# Patient Record
Sex: MALE | Race: WHITE | Employment: FULL TIME | URBAN - NONMETROPOLITAN AREA
[De-identification: names, ages, dates, MRNs, and addresses within clinical notes are randomized per-mention and may not be internally consistent; named-entity substitution may affect disease eponyms.]

---

## 2022-10-17 ENCOUNTER — OFFICE VISIT (OUTPATIENT)
Dept: FAMILY MEDICINE CLINIC | Age: 22
End: 2022-10-17
Payer: COMMERCIAL

## 2022-10-17 VITALS
SYSTOLIC BLOOD PRESSURE: 124 MMHG | DIASTOLIC BLOOD PRESSURE: 80 MMHG | TEMPERATURE: 98.3 F | OXYGEN SATURATION: 96 % | WEIGHT: 193 LBS | HEART RATE: 85 BPM | RESPIRATION RATE: 18 BRPM

## 2022-10-17 DIAGNOSIS — J02.9 SORE THROAT: ICD-10-CM

## 2022-10-17 DIAGNOSIS — J02.0 ACUTE STREPTOCOCCAL PHARYNGITIS: Primary | ICD-10-CM

## 2022-10-17 LAB — S PYO AG THROAT QL: POSITIVE

## 2022-10-17 PROCEDURE — G8427 DOCREV CUR MEDS BY ELIG CLIN: HCPCS | Performed by: NURSE PRACTITIONER

## 2022-10-17 PROCEDURE — G8421 BMI NOT CALCULATED: HCPCS | Performed by: NURSE PRACTITIONER

## 2022-10-17 PROCEDURE — 99213 OFFICE O/P EST LOW 20 MIN: CPT | Performed by: NURSE PRACTITIONER

## 2022-10-17 PROCEDURE — 87880 STREP A ASSAY W/OPTIC: CPT | Performed by: NURSE PRACTITIONER

## 2022-10-17 PROCEDURE — G8484 FLU IMMUNIZE NO ADMIN: HCPCS | Performed by: NURSE PRACTITIONER

## 2022-10-17 PROCEDURE — 4004F PT TOBACCO SCREEN RCVD TLK: CPT | Performed by: NURSE PRACTITIONER

## 2022-10-17 RX ORDER — AMOXICILLIN 875 MG/1
875 TABLET, COATED ORAL 2 TIMES DAILY
Qty: 20 TABLET | Refills: 0 | Status: SHIPPED | OUTPATIENT
Start: 2022-10-17 | End: 2022-10-27

## 2022-10-17 NOTE — PROGRESS NOTES
Subjective:  Chief Complaint   Patient presents with    Pharyngitis       HPI:  The patient states that they have had a sore throat for the last 1 days. Cough is productive of sputum. The patient also reports mild sore throat without pain with swallowing/difficulty swallowing. Reports fever at home. Patient denies CP or dyspnea. No vomiting or diarrhea. Patient has been taking OTC medications without improvement. The patient presents for evaluation. ROS:  Positive and pertinent negatives as per HPI. All other systems are reviewed and negative. Current Outpatient Medications:     amoxicillin (AMOXIL) 875 MG tablet, Take 1 tablet by mouth 2 times daily for 10 days, Disp: 20 tablet, Rfl: 0   No Known Allergies     Objective:  Vitals:    10/17/22 1041   BP: 124/80   Pulse: 85   Resp: 18   Temp: 98.3 °F (36.8 °C)   TempSrc: Temporal   SpO2: 96%   Weight: 193 lb (87.5 kg)        Exam:  Const: Appears healthy and well developed. No signs of acute distress present. Vitals reviewed per triage. Head/Face: Normocephalic, atraumatic. Facies is symmetric. Eyes: PERRL. ENMT: Tympanic membranes are pearly gray with good light reflex bilaterally. Nares are patent with clear rhinorrhea. Buccal mucosa is moist. Mild erythema in the posterior pharynx without edema of oropharynx or petechiae of palate. Neck: Supple and symmetric. Palpation reveals no adenopathy. No meningeal signs. Trachea midline. Resp: Lungs are clear to auscultation bilaterally without wheezes, rhonchi, or crackles. Chest expansion was symmetrical without accessory muscle use noted. CV: S1 is normal. S2 is normal.  Musculo: Patient moves extremities without pain or limitation. Pulses are equal bilaterally. Skin: Skin is warm and dry. Neuro: Alert and oriented x3. Speech is articulate and fluent. Psych: Mood and affect are appropriate to situation.       Discussed the use of probiotics to help prevent GI issues including C-diff colitis. Reviewed signs or symptoms that would warrant further evaluation. Kimo Fry was seen today for pharyngitis. Diagnoses and all orders for this visit:    Acute streptococcal pharyngitis  -     amoxicillin (AMOXIL) 875 MG tablet;  Take 1 tablet by mouth 2 times daily for 10 days    Sore throat  -     POCT rapid strep A         Seen By:    RAQUEL Chavez - CNP

## 2022-11-22 ENCOUNTER — OFFICE VISIT (OUTPATIENT)
Dept: FAMILY MEDICINE CLINIC | Age: 22
End: 2022-11-22
Payer: COMMERCIAL

## 2022-11-22 VITALS
RESPIRATION RATE: 18 BRPM | DIASTOLIC BLOOD PRESSURE: 80 MMHG | WEIGHT: 193 LBS | HEART RATE: 104 BPM | TEMPERATURE: 98.1 F | OXYGEN SATURATION: 96 % | SYSTOLIC BLOOD PRESSURE: 120 MMHG

## 2022-11-22 DIAGNOSIS — B34.9 VIRAL ILLNESS: ICD-10-CM

## 2022-11-22 DIAGNOSIS — R05.9 COUGH, UNSPECIFIED TYPE: ICD-10-CM

## 2022-11-22 DIAGNOSIS — Z20.822 CLOSE EXPOSURE TO COVID-19 VIRUS: Primary | ICD-10-CM

## 2022-11-22 LAB
Lab: NORMAL
PERFORMING INSTRUMENT: NORMAL
QC PASS/FAIL: NORMAL
SARS-COV-2, POC: NORMAL

## 2022-11-22 PROCEDURE — G8484 FLU IMMUNIZE NO ADMIN: HCPCS

## 2022-11-22 PROCEDURE — 99213 OFFICE O/P EST LOW 20 MIN: CPT

## 2022-11-22 PROCEDURE — 87426 SARSCOV CORONAVIRUS AG IA: CPT

## 2022-11-22 PROCEDURE — G8421 BMI NOT CALCULATED: HCPCS

## 2022-11-22 PROCEDURE — 4004F PT TOBACCO SCREEN RCVD TLK: CPT

## 2022-11-22 PROCEDURE — G8427 DOCREV CUR MEDS BY ELIG CLIN: HCPCS

## 2022-11-22 RX ORDER — METHYLPREDNISOLONE 4 MG/1
TABLET ORAL
Qty: 1 KIT | Refills: 0 | Status: SHIPPED | OUTPATIENT
Start: 2022-11-22

## 2022-11-22 RX ORDER — ALBUTEROL SULFATE 90 UG/1
2 AEROSOL, METERED RESPIRATORY (INHALATION) 4 TIMES DAILY PRN
Qty: 18 G | Refills: 0 | Status: SHIPPED | OUTPATIENT
Start: 2022-11-22

## 2022-11-22 NOTE — LETTER
[facility]  [address] [place] [address]  Phone: [phone]  Fax: [phone]    Abner Donahuema        November 22, 2022     Patient: Edouard Rod   YOB: 2000   Date of Visit: 11/22/2022       To Whom It May Concern: It is my medical opinion that Edouard Rod may return to work on 11/23, teste negative for COVID-19 in office. If you have any questions or concerns, please don't hesitate to call.     Sincerely,        JO Donahue

## 2022-11-22 NOTE — PROGRESS NOTES
Chief Complaint       Cough (X 1 week) and Headache    History of Present Illness   Source of history provided by:  patient. Karthik Bocanegra is a 25 y.o. old male presenting to the walk in clinic for evaluation of mild non-productive cough, headache, and nasal congestion x 1 week. Denies any fever, CP, dyspnea, LE edema, abdominal pain, vomiting, rash, or lethargy. Pt does have history of asthma. Patient reports recent sick exposures, roommate tested positive for COVID-19. Patient not been vaccinated for COVID-19. Patient has been taking nothing OTC without symptomatic relief. Pt has had COVID-19 in the past.     ROS    Unless otherwise stated in this report or unable to obtain because of the patient's clinical or mental status as evidenced by the medical record, this patients's positive and negative responses for Review of Systems, constitutional, psych, eyes, ENT, cardiovascular, respiratory, gastrointestinal, neurological, genitourinary, musculoskeletal, integument systems and systems related to the presenting problem are either stated in the preceding or were not pertinent or were negative for the symptoms and/or complaints related to the medical problem. Physical Exam         VS:  /80   Pulse (!) 104   Temp 98.1 °F (36.7 °C) (Temporal)   Resp 18   Wt 193 lb (87.5 kg)   SpO2 96%    Oxygen Saturation Interpretation: Normal.    Constitutional:  Alert, development consistent with age. NAD. Head:  NC/NT. Airway patent. Mouth: Posterior pharynx with mild erythema and clear postnasal drip. No tonsillar hypertrophy or exudate. Neck:  Normal ROM. Supple. No anterior cervical adenopathy noted. Lungs: CTAB without wheezes, rales, or rhonchi. CV:  Regular rate and rhythm, normal heart sounds, without pathological murmurs, ectopy, gallops, or rubs. Skin:  Normal turgor. Warm, dry, without visible rash. Lymphatic: No lymphangitis or adenopathy noted. Neurological:  Oriented.   Motor functions intact. Lab / Imaging Results   (All laboratory and radiology results have been personally reviewed by myself)  Labs:  Results for orders placed or performed in visit on 11/22/22   POCT COVID-19, Antigen   Result Value Ref Range    SARS-COV-2, POC Not-Detected Not Detected    Lot Number [id number]     QC Pass/Fail pass     Performing Instrument BD Veritor        Imaging: All Radiology results interpreted by Radiologist unless otherwise noted. Assessment / Plan     Impression(s):  Cipriano Epps was seen today for cough and headache. Diagnoses and all orders for this visit:    Close exposure to COVID-19 virus  -     POCT COVID-19, Antigen    Cough, unspecified type  -     POCT COVID-19, Antigen    Viral illness  -     POCT COVID-19, Antigen  -     albuterol sulfate HFA (VENTOLIN HFA) 108 (90 Base) MCG/ACT inhaler; Inhale 2 puffs into the lungs 4 times daily as needed for Wheezing  -     methylPREDNISolone (MEDROL DOSEPACK) 4 MG tablet; Take by mouth. Disposition:  Disposition: Discharge to home. Pt does report history of asthma, requesting albuterol inhaler as he has been out for years, offered 1 time refill due to current symptoms. Rapid COVID-19 testing is negative in office. Increase fluids and rest. Symptomatic relief discussed including Tylenol prn pain/fever. Schedule f/u with PCP in 7-10 days if symptoms persist. ED sooner if symptoms worsen or change. ED immediately with high or refractory fever, progressive SOB, dyspnea, CP, calf pain/swelling, shaking chills, vomiting, abdominal pain, lethargy, flank pain, or decreased urinary output. Pt verbalizes understanding and is in agreement with plan of care. All questions answered. JO Christy    **This report was transcribed using voice recognition software. Every effort was made to ensure accuracy; however, inadvertent computerized transcription errors may be present.